# Patient Record
Sex: FEMALE
[De-identification: names, ages, dates, MRNs, and addresses within clinical notes are randomized per-mention and may not be internally consistent; named-entity substitution may affect disease eponyms.]

---

## 2024-01-31 LAB
ABO, EXTERNAL RESULT: NORMAL
HEP B, EXTERNAL RESULT: NEGATIVE
HIV, EXTERNAL RESULT: NEGATIVE
RPR, EXTERNAL RESULT: NONREACTIVE
RUBELLA TITER, EXTERNAL RESULT: NORMAL

## 2024-05-18 ENCOUNTER — HOSPITAL ENCOUNTER (EMERGENCY)
Facility: HOSPITAL | Age: 29
Discharge: HOME | End: 2024-05-18

## 2024-05-18 VITALS
TEMPERATURE: 98.1 F | SYSTOLIC BLOOD PRESSURE: 115 MMHG | HEART RATE: 94 BPM | DIASTOLIC BLOOD PRESSURE: 56 MMHG | RESPIRATION RATE: 14 BRPM

## 2024-05-18 DIAGNOSIS — Z3A.25 25 WEEKS GESTATION OF PREGNANCY: Primary | ICD-10-CM

## 2024-05-18 DIAGNOSIS — O36.8120 DECREASED FETAL MOVEMENTS IN SECOND TRIMESTER, SINGLE OR UNSPECIFIED FETUS: ICD-10-CM

## 2024-05-18 PROCEDURE — 99282 EMERGENCY DEPT VISIT SF MDM: CPT

## 2024-05-18 PROCEDURE — 4500000002 HC ER NO CHARGE

## 2024-05-18 ASSESSMENT — ENCOUNTER SYMPTOMS
VOMITING: 0
NAUSEA: 0
ABDOMINAL PAIN: 0
COUGH: 0
SORE THROAT: 0

## 2024-05-18 NOTE — ED PROVIDER NOTES
Activity: Not on file   Stress: Not on file   Social Connections: Not on file   Intimate Partner Violence: Not on file   Housing Stability: Not on file   Denies hx STIs      ALLERGIES: Patient has no known allergies.    Review of Systems   Constitutional:  Negative for chills, fatigue and fever.   HENT:  Negative for sore throat.    Eyes:  Negative for visual disturbance.   Respiratory:  Negative for cough and shortness of breath.    Cardiovascular:  Negative for chest pain.   Gastrointestinal:  Negative for abdominal pain, diarrhea, nausea and vomiting.   Genitourinary:  Negative for dysuria, flank pain, frequency, pelvic pain, urgency, vaginal bleeding and vaginal discharge.   Neurological:  Negative for headaches.       Vitals:    05/18/24 1200   BP: (!) 115/56   Pulse: 94   Resp: 14   Temp: 98.1 °F (36.7 °C)   TempSrc: Oral            Physical Exam  Constitutional:       General: She is awake. She is not in acute distress.     Appearance: Normal appearance. She is well-groomed. She is obese. She is not ill-appearing.   HENT:      Head: Normocephalic.   Cardiovascular:      Rate and Rhythm: Normal rate.   Pulmonary:      Effort: Pulmonary effort is normal. No respiratory distress.   Abdominal:      Tenderness: There is no abdominal tenderness.      Comments: Gravid c/w 25w  FHTs: 145s, accels present, decels absent, moderate variability; audible movement heard on US; patient initially not feeling and then started feeling movement  [place]: none   Genitourinary:     Rectum: Normal.      Comments: Deferred, no labor complaints  Musculoskeletal:         General: Normal range of motion.      Cervical back: Normal range of motion.      Right lower leg: No edema.      Left lower leg: No edema.   Skin:     General: Skin is warm and dry.   Neurological:      Mental Status: She is alert and oriented to person, place, and time.      Gait: Gait is intact.   Psychiatric:         Attention and Perception: Attention normal.

## 2024-05-18 NOTE — PROGRESS NOTES
1153: pt here from home with reports of not feeling the baby move x 2days. Denies bleeding or leaking fluid.  1206: Thom BLUE at bedside   1212: pt felt fetus kick  1220: ambulatory off unit

## 2024-07-31 LAB — GBS, EXTERNAL RESULT: NEGATIVE

## 2024-08-15 ENCOUNTER — HOSPITAL ENCOUNTER (OUTPATIENT)
Facility: HOSPITAL | Age: 29
Setting detail: OBSERVATION
Discharge: HOME | End: 2024-08-15
Attending: STUDENT IN AN ORGANIZED HEALTH CARE EDUCATION/TRAINING PROGRAM | Admitting: OBSTETRICS & GYNECOLOGY
Payer: COMMERCIAL

## 2024-08-15 VITALS
SYSTOLIC BLOOD PRESSURE: 127 MMHG | OXYGEN SATURATION: 98 % | TEMPERATURE: 97.7 F | DIASTOLIC BLOOD PRESSURE: 66 MMHG | HEART RATE: 81 BPM | RESPIRATION RATE: 17 BRPM

## 2024-08-15 PROBLEM — O36.8190 DECREASED FETAL MOVEMENT AFFECTING MANAGEMENT OF MOTHER, ANTEPARTUM: Status: ACTIVE | Noted: 2024-08-15

## 2024-08-15 PROCEDURE — 4500000002 HC ER NO CHARGE

## 2024-08-15 PROCEDURE — G0378 HOSPITAL OBSERVATION PER HR: HCPCS

## 2024-08-15 RX ORDER — ACETAMINOPHEN 500 MG
1000 TABLET ORAL EVERY 8 HOURS SCHEDULED
Status: DISCONTINUED | OUTPATIENT
Start: 2024-08-15 | End: 2024-08-16 | Stop reason: HOSPADM

## 2024-08-15 RX ORDER — ONDANSETRON 4 MG/1
4 TABLET, ORALLY DISINTEGRATING ORAL EVERY 8 HOURS PRN
Status: DISCONTINUED | OUTPATIENT
Start: 2024-08-15 | End: 2024-08-16 | Stop reason: HOSPADM

## 2024-08-15 RX ORDER — ONDANSETRON 2 MG/ML
4 INJECTION INTRAMUSCULAR; INTRAVENOUS EVERY 6 HOURS PRN
Status: DISCONTINUED | OUTPATIENT
Start: 2024-08-15 | End: 2024-08-16 | Stop reason: HOSPADM

## 2024-08-15 NOTE — PROGRESS NOTES
Bedside and Verbal shift change report given to TALHA Macias RN (oncoming nurse) by ANTOINE Cullen (offgoing nurse). Report included the following information Nurse Handoff Report, Index, Adult Overview, MAR, and Recent Results.    2153: Dr. Armendariz at bedside, speaking with patient. OK to D/C patient.

## 2024-08-15 NOTE — PROGRESS NOTES
1637-pt here with decreased fetal movement  1650--Marcello in to see patient, will continue to monitor

## 2024-08-15 NOTE — ED PROVIDER NOTES
Deb is a pleasant 27 yo G1 @ 37 6/7 wks who presents to [facility] this afternoon with CC of decreased FM.  She states she hasn't really felt movement since this AM.  She had normal movement yesterday, but had a migraine, took some tylenol and then slept all night.  She normally notices movement very easily with her entire abdomen moving but not so today.  She denies any trauma or strenuous activity.  Only additional med has been the tylenol.  She has eaten normally today.    PNC has been with Dr. Richardson of [facility] and has been essentially uncomplicated:  --zoloft  --TDAP 6/6/2024  --failed 1 hr, passed 3 hr  --Rh+/RI/GBS neg/HB neg/VDRL NR/HIV NR        The history is provided by the patient and medical records.        No chief complaint on file.      Past Medical History:   Diagnosis Date    Anxiety and depression        Past Surgical History:   Procedure Laterality Date    TONSILLECTOMY      WISDOM TOOTH EXTRACTION           Family History   Problem Relation Age of Onset    Breast Cancer Paternal Grandmother     Diabetes Maternal Grandfather     High Cholesterol Father     Hypertension Father        Social History     Socioeconomic History    Marital status: Not on file     Spouse name: Not on file    Number of children: Not on file    Years of education: Not on file    Highest education level: Not on file   Occupational History    Not on file   Tobacco Use    Smoking status: Never    Smokeless tobacco: Never   Substance and Sexual Activity    Alcohol use: Not Currently    Drug use: Never    Sexual activity: Not on file   Other Topics Concern    Not on file   Social History Narrative    Not on file     Social Determinants of Health     Financial Resource Strain: Not on file   Food Insecurity: Not on file   Transportation Needs: Not on file   Physical Activity: Not on file   Stress: Not on file   Social Connections: Not on file   Intimate Partner Violence: Not on file   Housing Stability: Not on file         ALLERGIES:

## 2024-08-15 NOTE — PROGRESS NOTES
1637 Pt of MD arrives ambulatory with significant other c/o decreased fetal movement. Pt is , GA 37w6d. PMH significant for anxiety/depression. Pt has NKA and takes daily PNV. Pt denies LOF, VB, or contractions. Pt denies headache, blurred vision, or RUQ pain. MD notified of pt arrival. Pt oriented to room and call bell within reach.    1650 Dr. Armendariz at bedside to assess pt and discuss plan of care. Verbal orders received

## 2024-08-16 NOTE — H&P
Pt admitted from [facility] for prolonged fetal monitoring.    Deb is a pleasant 29 yo G1 @ 37 6/7 wks who presents to [facility] this afternoon with CC of decreased FM.  She states she hasn't really felt movement since this AM.  She had normal movement yesterday, but had a migraine, took some tylenol and then slept all night.  She normally notices movement very easily with her entire abdomen moving but not so today.  She denies any trauma or strenuous activity.  Only additional med has been the tylenol.  She has eaten normally today.     PNC has been with Dr. Richardson of [facility] and has been essentially uncomplicated:  --zoloft  --TDAP 6/6/2024  --failed 1 hr, passed 3 hr  --Rh+/RI/GBS neg/HB neg/VDRL NR/HIV NR           The history is provided by the patient and medical records.         No chief complaint on file.        Past Medical History        Past Medical History:   Diagnosis Date    Anxiety and depression              Past Surgical History         Past Surgical History:   Procedure Laterality Date    TONSILLECTOMY        WISDOM TOOTH EXTRACTION                 Family History         Family History   Problem Relation Age of Onset    Breast Cancer Paternal Grandmother      Diabetes Maternal Grandfather      High Cholesterol Father      Hypertension Father              Social History               Socioeconomic History    Marital status: Not on file       Spouse name: Not on file    Number of children: Not on file    Years of education: Not on file    Highest education level: Not on file   Occupational History    Not on file   Tobacco Use    Smoking status: Never    Smokeless tobacco: Never   Substance and Sexual Activity    Alcohol use: Not Currently    Drug use: Never    Sexual activity: Not on file   Other Topics Concern    Not on file   Social History Narrative    Not on file      Social Determinants of Health      Financial Resource Strain: Not on file   Food Insecurity: Not on file   Transportation Needs: Not on file

## 2024-08-16 NOTE — DISCHARGE SUMMARY
Antepartum  Discharge Summary     Patient ID:  Deb Mckenzie  [id number]  28 y.o.  1995    Admit date: 8/15/2024    Discharge date: 8/15/2024    Admission Diagnoses:    Patient Active Problem List   Diagnosis    Decreased fetal movement affecting management of mother, antepartum       Discharge Diagnoses: There are no discharge diagnoses documented for the most recent discharge.  Patient Active Problem List   Diagnosis    Decreased fetal movement affecting management of mother, antepartum       Procedures for this admission: Prolonged monitorin    Hospital Course:  Pt monitored for over 5 hours with CAT I tracing.  She was feeling more movement during stay.  Initial monitoring was concerning for min variability and no accels.  At times negative CST noted in [facility].  At times reactive tracing.  Except for initial monitoring, reassuring tracing throughout.    Disposition: Home or self care    Discharged Condition: good    Patient plans to return for changes in her condition or the condition of the baby or for delivery of the baby.    Patient Instructions:   Discharge Medication List as of 8/15/2024 10:19 PM        CONTINUE these medications which have NOT CHANGED    Details   sertraline (ZOLOFT) 50 MG tablet Take 1 tablet by mouth dailyHistorical Med           Activity: activity as tolerated  Diet: regular diet    Follow-up with No follow-ups on file.     Signed:  Melania Armendariz MD  8/15/2024  10:34 PM

## 2024-08-16 NOTE — PROGRESS NOTES
Pt monitored x 5+ hours.  Reassuring and at times reactive tracing throughout.  Pt started feeling some more movement while here.  Ok to discharge home.  Pt to follow up in office tomorrow AM as scheduled.  Advised to return for evaluation if decreased FM.

## 2024-08-31 ENCOUNTER — HOSPITAL ENCOUNTER (EMERGENCY)
Facility: HOSPITAL | Age: 29
Discharge: HOME | DRG: 560 | End: 2024-08-31
Payer: MEDICAID

## 2024-08-31 ENCOUNTER — ANESTHESIA EVENT (OUTPATIENT)
Facility: HOSPITAL | Age: 29
End: 2024-08-31
Payer: COMMERCIAL

## 2024-08-31 ENCOUNTER — ANESTHESIA (OUTPATIENT)
Facility: HOSPITAL | Age: 29
End: 2024-08-31
Payer: COMMERCIAL

## 2024-08-31 ENCOUNTER — HOSPITAL ENCOUNTER (INPATIENT)
Facility: HOSPITAL | Age: 29
LOS: 2 days | Discharge: HOME | DRG: 560 | End: 2024-09-02
Attending: STUDENT IN AN ORGANIZED HEALTH CARE EDUCATION/TRAINING PROGRAM | Admitting: OBSTETRICS & GYNECOLOGY
Payer: MEDICAID

## 2024-08-31 VITALS
TEMPERATURE: 97.8 F | RESPIRATION RATE: 16 BRPM | OXYGEN SATURATION: 99 % | HEART RATE: 77 BPM | WEIGHT: 217 LBS | DIASTOLIC BLOOD PRESSURE: 64 MMHG | SYSTOLIC BLOOD PRESSURE: 128 MMHG

## 2024-08-31 PROBLEM — O47.9 UTERINE CONTRACTIONS: Status: ACTIVE | Noted: 2024-08-31

## 2024-08-31 LAB
ABO + RH BLD: NORMAL
BASOPHILS # BLD: 0.1 K/UL (ref 0–0.1)
BASOPHILS NFR BLD: 0 % (ref 0–1)
BLOOD GROUP ANTIBODIES SERPL: NORMAL
DIFFERENTIAL METHOD BLD: ABNORMAL
EOSINOPHIL # BLD: 0 K/UL (ref 0–0.4)
EOSINOPHIL NFR BLD: 0 % (ref 0–7)
ERYTHROCYTE [DISTWIDTH] IN BLOOD BY AUTOMATED COUNT: 12.3 % (ref 11.5–14.5)
HCT VFR BLD AUTO: 37.1 % (ref 35–47)
HGB BLD-MCNC: 13.1 G/DL (ref 11.5–16)
IMM GRANULOCYTES # BLD AUTO: 0.1 K/UL (ref 0–0.04)
IMM GRANULOCYTES NFR BLD AUTO: 1 % (ref 0–0.5)
LYMPHOCYTES # BLD: 1.7 K/UL (ref 0.8–3.5)
LYMPHOCYTES NFR BLD: 9 % (ref 12–49)
MCH RBC QN AUTO: 29.1 PG (ref 26–34)
MCHC RBC AUTO-ENTMCNC: 35.3 G/DL (ref 30–36.5)
MCV RBC AUTO: 82.4 FL (ref 80–99)
MONOCYTES # BLD: 0.8 K/UL (ref 0–1)
MONOCYTES NFR BLD: 4 % (ref 5–13)
NEUTS SEG # BLD: 16.8 K/UL (ref 1.8–8)
NEUTS SEG NFR BLD: 86 % (ref 32–75)
NRBC # BLD: 0 K/UL (ref 0–0.01)
NRBC BLD-RTO: 0 PER 100 WBC
PLATELET # BLD AUTO: 284 K/UL (ref 150–400)
PMV BLD AUTO: 10.2 FL (ref 8.9–12.9)
RBC # BLD AUTO: 4.5 M/UL (ref 3.8–5.2)
SPECIMEN EXP DATE BLD: NORMAL
WBC # BLD AUTO: 19.4 K/UL (ref 3.6–11)

## 2024-08-31 PROCEDURE — 7220000101 HC DELIVERY VAGINAL/SINGLE

## 2024-08-31 PROCEDURE — 51701 INSERT BLADDER CATHETER: CPT

## 2024-08-31 PROCEDURE — 00HU33Z INSERTION OF INFUSION DEVICE INTO SPINAL CANAL, PERCUTANEOUS APPROACH: ICD-10-PCS | Performed by: ANESTHESIOLOGY

## 2024-08-31 PROCEDURE — 86850 RBC ANTIBODY SCREEN: CPT

## 2024-08-31 PROCEDURE — 6360000002 HC RX W HCPCS: Performed by: MIDWIFE

## 2024-08-31 PROCEDURE — 86901 BLOOD TYPING SEROLOGIC RH(D): CPT

## 2024-08-31 PROCEDURE — 7100000000 HC PACU RECOVERY - FIRST 15 MIN

## 2024-08-31 PROCEDURE — 7100000001 HC PACU RECOVERY - ADDTL 15 MIN

## 2024-08-31 PROCEDURE — 2580000003 HC RX 258: Performed by: MIDWIFE

## 2024-08-31 PROCEDURE — 0KQM0ZZ REPAIR PERINEUM MUSCLE, OPEN APPROACH: ICD-10-PCS | Performed by: STUDENT IN AN ORGANIZED HEALTH CARE EDUCATION/TRAINING PROGRAM

## 2024-08-31 PROCEDURE — 4A1HXCZ MONITORING OF PRODUCTS OF CONCEPTION, CARDIAC RATE, EXTERNAL APPROACH: ICD-10-PCS | Performed by: STUDENT IN AN ORGANIZED HEALTH CARE EDUCATION/TRAINING PROGRAM

## 2024-08-31 PROCEDURE — 7210000100 HC LABOR FEE PER 1 HR

## 2024-08-31 PROCEDURE — 36415 COLL VENOUS BLD VENIPUNCTURE: CPT

## 2024-08-31 PROCEDURE — 10907ZC DRAINAGE OF AMNIOTIC FLUID, THERAPEUTIC FROM PRODUCTS OF CONCEPTION, VIA NATURAL OR ARTIFICIAL OPENING: ICD-10-PCS | Performed by: STUDENT IN AN ORGANIZED HEALTH CARE EDUCATION/TRAINING PROGRAM

## 2024-08-31 PROCEDURE — 6360000002 HC RX W HCPCS: Performed by: ANESTHESIOLOGY

## 2024-08-31 PROCEDURE — 2500000003 HC RX 250 WO HCPCS: Performed by: ANESTHESIOLOGY

## 2024-08-31 PROCEDURE — 86900 BLOOD TYPING SEROLOGIC ABO: CPT

## 2024-08-31 PROCEDURE — 3700000025 EPIDURAL BLOCK: Performed by: STUDENT IN AN ORGANIZED HEALTH CARE EDUCATION/TRAINING PROGRAM

## 2024-08-31 PROCEDURE — 85025 COMPLETE CBC W/AUTO DIFF WBC: CPT

## 2024-08-31 PROCEDURE — 86592 SYPHILIS TEST NON-TREP QUAL: CPT

## 2024-08-31 PROCEDURE — 4500000002 HC ER NO CHARGE

## 2024-08-31 PROCEDURE — 99284 EMERGENCY DEPT VISIT MOD MDM: CPT

## 2024-08-31 PROCEDURE — 6370000000 HC RX 637 (ALT 250 FOR IP): Performed by: MIDWIFE

## 2024-08-31 PROCEDURE — 1100000000 HC RM PRIVATE

## 2024-08-31 RX ORDER — IBUPROFEN 400 MG/1
800 TABLET, FILM COATED ORAL EVERY 8 HOURS SCHEDULED
Status: DISCONTINUED | OUTPATIENT
Start: 2024-08-31 | End: 2024-09-02 | Stop reason: HOSPADM

## 2024-08-31 RX ORDER — FENTANYL CITRATE 50 UG/ML
INJECTION, SOLUTION INTRAMUSCULAR; INTRAVENOUS
Status: DISCONTINUED
Start: 2024-08-31 | End: 2024-08-31

## 2024-08-31 RX ORDER — SODIUM CHLORIDE 9 MG/ML
25 INJECTION, SOLUTION INTRAVENOUS PRN
Status: DISCONTINUED | OUTPATIENT
Start: 2024-08-31 | End: 2024-08-31

## 2024-08-31 RX ORDER — EPHEDRINE SULFATE 50 MG/ML
10 INJECTION INTRAVENOUS
Status: DISCONTINUED | OUTPATIENT
Start: 2024-08-31 | End: 2024-08-31

## 2024-08-31 RX ORDER — ONDANSETRON 2 MG/ML
4 INJECTION INTRAMUSCULAR; INTRAVENOUS EVERY 6 HOURS PRN
Status: DISCONTINUED | OUTPATIENT
Start: 2024-08-31 | End: 2024-08-31

## 2024-08-31 RX ORDER — SODIUM CHLORIDE, SODIUM LACTATE, POTASSIUM CHLORIDE, AND CALCIUM CHLORIDE .6; .31; .03; .02 G/100ML; G/100ML; G/100ML; G/100ML
500 INJECTION, SOLUTION INTRAVENOUS PRN
Status: DISCONTINUED | OUTPATIENT
Start: 2024-08-31 | End: 2024-08-31

## 2024-08-31 RX ORDER — DIPHENHYDRAMINE HCL 25 MG
25 CAPSULE ORAL EVERY 6 HOURS PRN
Status: DISCONTINUED | OUTPATIENT
Start: 2024-08-31 | End: 2024-08-31

## 2024-08-31 RX ORDER — ACETAMINOPHEN 500 MG
1000 TABLET ORAL EVERY 8 HOURS SCHEDULED
Status: DISCONTINUED | OUTPATIENT
Start: 2024-08-31 | End: 2024-09-02 | Stop reason: HOSPADM

## 2024-08-31 RX ORDER — NALOXONE HYDROCHLORIDE 0.4 MG/ML
INJECTION, SOLUTION INTRAMUSCULAR; INTRAVENOUS; SUBCUTANEOUS PRN
Status: DISCONTINUED | OUTPATIENT
Start: 2024-08-31 | End: 2024-08-31

## 2024-08-31 RX ORDER — ONDANSETRON 2 MG/ML
4 INJECTION INTRAMUSCULAR; INTRAVENOUS EVERY 6 HOURS PRN
Status: DISCONTINUED | OUTPATIENT
Start: 2024-08-31 | End: 2024-09-02 | Stop reason: HOSPADM

## 2024-08-31 RX ORDER — CARBOPROST TROMETHAMINE 250 UG/ML
250 INJECTION, SOLUTION INTRAMUSCULAR PRN
Status: DISCONTINUED | OUTPATIENT
Start: 2024-08-31 | End: 2024-08-31

## 2024-08-31 RX ORDER — LIDOCAINE HCL/EPINEPHRINE/PF 2%-1:200K
VIAL (ML) INJECTION
Status: COMPLETED
Start: 2024-08-31 | End: 2024-08-31

## 2024-08-31 RX ORDER — SODIUM CHLORIDE, SODIUM LACTATE, POTASSIUM CHLORIDE, AND CALCIUM CHLORIDE .6; .31; .03; .02 G/100ML; G/100ML; G/100ML; G/100ML
500 INJECTION, SOLUTION INTRAVENOUS ONCE
Status: COMPLETED | OUTPATIENT
Start: 2024-08-31 | End: 2024-08-31

## 2024-08-31 RX ORDER — BUPIVACAINE HYDROCHLORIDE 5 MG/ML
INJECTION, SOLUTION EPIDURAL; INTRACAUDAL
Status: COMPLETED
Start: 2024-08-31 | End: 2024-08-31

## 2024-08-31 RX ORDER — METHYLERGONOVINE MALEATE 0.2 MG/ML
200 INJECTION INTRAVENOUS PRN
Status: DISCONTINUED | OUTPATIENT
Start: 2024-08-31 | End: 2024-08-31

## 2024-08-31 RX ORDER — SODIUM CHLORIDE 0.9 % (FLUSH) 0.9 %
5-40 SYRINGE (ML) INJECTION PRN
Status: DISCONTINUED | OUTPATIENT
Start: 2024-08-31 | End: 2024-08-31

## 2024-08-31 RX ORDER — FENTANYL CITRATE 50 UG/ML
INJECTION, SOLUTION INTRAMUSCULAR; INTRAVENOUS PRN
Status: DISCONTINUED | OUTPATIENT
Start: 2024-08-31 | End: 2024-08-31 | Stop reason: SDUPTHER

## 2024-08-31 RX ORDER — SODIUM CHLORIDE 0.9 % (FLUSH) 0.9 %
5-40 SYRINGE (ML) INJECTION EVERY 12 HOURS SCHEDULED
Status: DISCONTINUED | OUTPATIENT
Start: 2024-08-31 | End: 2024-09-02 | Stop reason: HOSPADM

## 2024-08-31 RX ORDER — BUPIVACAINE HYDROCHLORIDE 2.5 MG/ML
INJECTION, SOLUTION EPIDURAL; INFILTRATION; INTRACAUDAL PRN
Status: DISCONTINUED | OUTPATIENT
Start: 2024-08-31 | End: 2024-08-31 | Stop reason: SDUPTHER

## 2024-08-31 RX ORDER — EPHEDRINE SULFATE 50 MG/ML
5 INJECTION INTRAVENOUS PRN
Status: DISCONTINUED | OUTPATIENT
Start: 2024-09-01 | End: 2024-08-31

## 2024-08-31 RX ORDER — DIPHENHYDRAMINE HYDROCHLORIDE 50 MG/ML
25 INJECTION INTRAMUSCULAR; INTRAVENOUS EVERY 6 HOURS PRN
Status: DISCONTINUED | OUTPATIENT
Start: 2024-08-31 | End: 2024-08-31

## 2024-08-31 RX ORDER — SODIUM CHLORIDE, SODIUM LACTATE, POTASSIUM CHLORIDE, CALCIUM CHLORIDE 600; 310; 30; 20 MG/100ML; MG/100ML; MG/100ML; MG/100ML
INJECTION, SOLUTION INTRAVENOUS CONTINUOUS
Status: DISCONTINUED | OUTPATIENT
Start: 2024-08-31 | End: 2024-08-31

## 2024-08-31 RX ORDER — SODIUM CHLORIDE 0.9 % (FLUSH) 0.9 %
5-40 SYRINGE (ML) INJECTION PRN
Status: DISCONTINUED | OUTPATIENT
Start: 2024-08-31 | End: 2024-09-02 | Stop reason: HOSPADM

## 2024-08-31 RX ORDER — ONDANSETRON 4 MG/1
4 TABLET, ORALLY DISINTEGRATING ORAL EVERY 6 HOURS PRN
Status: DISCONTINUED | OUTPATIENT
Start: 2024-08-31 | End: 2024-09-02 | Stop reason: HOSPADM

## 2024-08-31 RX ORDER — FENTANYL/BUPIVACAINE/NS/PF 2-1250MCG
1-15 PLASTIC BAG, INJECTION (ML) INJECTION CONTINUOUS
Status: DISCONTINUED | OUTPATIENT
Start: 2024-08-31 | End: 2024-08-31

## 2024-08-31 RX ORDER — ONDANSETRON 4 MG/1
4 TABLET, ORALLY DISINTEGRATING ORAL EVERY 6 HOURS PRN
Status: DISCONTINUED | OUTPATIENT
Start: 2024-08-31 | End: 2024-08-31

## 2024-08-31 RX ORDER — BUPIVACAINE HYDROCHLORIDE 5 MG/ML
INJECTION, SOLUTION EPIDURAL; INTRACAUDAL PRN
Status: DISCONTINUED | OUTPATIENT
Start: 2024-08-31 | End: 2024-08-31 | Stop reason: SDUPTHER

## 2024-08-31 RX ORDER — DOCUSATE SODIUM 100 MG/1
100 CAPSULE, LIQUID FILLED ORAL 2 TIMES DAILY
Status: DISCONTINUED | OUTPATIENT
Start: 2024-08-31 | End: 2024-09-02 | Stop reason: HOSPADM

## 2024-08-31 RX ORDER — LIDOCAINE HCL/EPINEPHRINE/PF 2%-1:200K
VIAL (ML) INJECTION PRN
Status: DISCONTINUED | OUTPATIENT
Start: 2024-08-31 | End: 2024-08-31 | Stop reason: SDUPTHER

## 2024-08-31 RX ORDER — BUPIVACAINE HYDROCHLORIDE 2.5 MG/ML
INJECTION, SOLUTION EPIDURAL; INFILTRATION; INTRACAUDAL
Status: COMPLETED
Start: 2024-08-31 | End: 2024-08-31

## 2024-08-31 RX ORDER — MODIFIED LANOLIN
OINTMENT (GRAM) TOPICAL PRN
Status: DISCONTINUED | OUTPATIENT
Start: 2024-08-31 | End: 2024-09-02 | Stop reason: HOSPADM

## 2024-08-31 RX ORDER — SODIUM CHLORIDE 0.9 % (FLUSH) 0.9 %
5-40 SYRINGE (ML) INJECTION EVERY 12 HOURS SCHEDULED
Status: DISCONTINUED | OUTPATIENT
Start: 2024-08-31 | End: 2024-08-31

## 2024-08-31 RX ORDER — MISOPROSTOL 200 UG/1
400 TABLET ORAL PRN
Status: DISCONTINUED | OUTPATIENT
Start: 2024-08-31 | End: 2024-08-31

## 2024-08-31 RX ORDER — ACETAMINOPHEN 500 MG
1000 TABLET ORAL EVERY 8 HOURS PRN
Status: DISCONTINUED | OUTPATIENT
Start: 2024-08-31 | End: 2024-08-31

## 2024-08-31 RX ORDER — TERBUTALINE SULFATE 1 MG/ML
0.25 INJECTION, SOLUTION SUBCUTANEOUS
Status: DISCONTINUED | OUTPATIENT
Start: 2024-08-31 | End: 2024-08-31

## 2024-08-31 RX ORDER — SODIUM CHLORIDE, SODIUM LACTATE, POTASSIUM CHLORIDE, CALCIUM CHLORIDE 600; 310; 30; 20 MG/100ML; MG/100ML; MG/100ML; MG/100ML
INJECTION, SOLUTION INTRAVENOUS CONTINUOUS
Status: DISCONTINUED | OUTPATIENT
Start: 2024-08-31 | End: 2024-09-02 | Stop reason: HOSPADM

## 2024-08-31 RX ORDER — SODIUM CHLORIDE 9 MG/ML
INJECTION, SOLUTION INTRAVENOUS PRN
Status: DISCONTINUED | OUTPATIENT
Start: 2024-08-31 | End: 2024-09-02 | Stop reason: HOSPADM

## 2024-08-31 RX ADMIN — SODIUM CHLORIDE, SODIUM LACTATE, POTASSIUM CHLORIDE, AND CALCIUM CHLORIDE 500 ML: 600; 310; 30; 20 INJECTION, SOLUTION INTRAVENOUS at 02:50

## 2024-08-31 RX ADMIN — BUPIVACAINE HYDROCHLORIDE 5 ML: 5 INJECTION, SOLUTION EPIDURAL; INTRACAUDAL; PERINEURAL at 10:18

## 2024-08-31 RX ADMIN — Medication 10 ML/HR: at 10:32

## 2024-08-31 RX ADMIN — FENTANYL CITRATE 100 MCG: 50 INJECTION, SOLUTION INTRAMUSCULAR; INTRAVENOUS at 10:18

## 2024-08-31 RX ADMIN — SODIUM CHLORIDE, POTASSIUM CHLORIDE, SODIUM LACTATE AND CALCIUM CHLORIDE: 600; 310; 30; 20 INJECTION, SOLUTION INTRAVENOUS at 21:50

## 2024-08-31 RX ADMIN — BUPIVACAINE HYDROCHLORIDE 5 ML: 2.5 INJECTION, SOLUTION EPIDURAL; INFILTRATION; INTRACAUDAL; PERINEURAL at 17:53

## 2024-08-31 RX ADMIN — BUPIVACAINE HYDROCHLORIDE 5 MG: 2.5 INJECTION, SOLUTION EPIDURAL; INFILTRATION; INTRACAUDAL; PERINEURAL at 15:43

## 2024-08-31 RX ADMIN — FENTANYL CITRATE 100 MCG: 50 INJECTION, SOLUTION INTRAMUSCULAR; INTRAVENOUS at 18:54

## 2024-08-31 RX ADMIN — IBUPROFEN 800 MG: 400 TABLET, FILM COATED ORAL at 23:33

## 2024-08-31 RX ADMIN — OXYTOCIN 1 MILLI-UNITS/MIN: 10 INJECTION INTRAVENOUS at 20:54

## 2024-08-31 RX ADMIN — Medication 166.7 ML: at 22:26

## 2024-08-31 RX ADMIN — SODIUM CHLORIDE, POTASSIUM CHLORIDE, SODIUM LACTATE AND CALCIUM CHLORIDE: 600; 310; 30; 20 INJECTION, SOLUTION INTRAVENOUS at 18:54

## 2024-08-31 RX ADMIN — Medication 10 ML/HR: at 17:43

## 2024-08-31 RX ADMIN — LIDOCAINE HYDROCHLORIDE AND EPINEPHRINE 5 ML: 20; 5 INJECTION, SOLUTION EPIDURAL; INFILTRATION; INTRACAUDAL; PERINEURAL at 10:15

## 2024-08-31 RX ADMIN — FENTANYL CITRATE 100 MCG: 50 INJECTION, SOLUTION INTRAMUSCULAR; INTRAVENOUS at 15:43

## 2024-08-31 RX ADMIN — SODIUM CHLORIDE, POTASSIUM CHLORIDE, SODIUM LACTATE AND CALCIUM CHLORIDE 500 ML: 600; 310; 30; 20 INJECTION, SOLUTION INTRAVENOUS at 09:37

## 2024-08-31 RX ADMIN — LIDOCAINE HYDROCHLORIDE AND EPINEPHRINE 6 ML: 20; 5 INJECTION, SOLUTION EPIDURAL; INFILTRATION; INTRACAUDAL; PERINEURAL at 18:54

## 2024-08-31 NOTE — ANESTHESIA PROCEDURE NOTES
Epidural Block    Patient location during procedure: OB  Start time: 8/31/2024 10:08 AM  End time: 8/31/2024 10:19 AM  Reason for block: labor epidural  Staffing  Performed: anesthesiologist   Anesthesiologist: Sergei Billings MD  Performed by: Sergei Billings MD  Authorized by: Sergei Billings MD    Epidural  Patient position: sitting  Prep: DuraPrep  Patient monitoring: cardiac monitor, continuous pulse ox and frequent blood pressure checks  Approach: midline  Location: L3-4  Injection technique: BILL air  Provider prep: mask and sterile gloves  Needle  Needle type: Tuohy   Needle gauge: 17 G  Needle length: 3.5 in  Needle insertion depth: 7 cm  Catheter type: end hole  Catheter size: 18 G  Catheter at skin depth: 11 cmCatheter Secured: tegaderm and tape  Assessment  Sensory level: T10  Events: None  Hemodynamics: stable  Attempts: 1  Outcomes: uncomplicated and patient tolerated procedure well  Preanesthetic Checklist  Completed: patient identified, IV checked, site marked, risks and benefits discussed, surgical/procedural consents, equipment checked, pre-op evaluation, timeout performed, anesthesia consent given, oxygen available, monitors applied/VS acknowledged and fire risk safety assessment completed and verbalized

## 2024-08-31 NOTE — PROGRESS NOTES
24: Bedside and Verbal shift change report given to Jeanne Cochran RN (oncoming nurse) by Diane Montgomery RN (offgoing nurse). Report included the following information Nurse Handoff Report, Adult Overview, Intake/Output, MAR, Recent Results, and Event Log.     Pt resting comfortably in hands/knees position, partner at BS. Will continue to labor down and reassess pressure/urge to push.     : Pt repositioned from Hands/Knees to supine with R tilt per request. Prolonged decel following position change. Repositioned to R lateral, trendelenburg, RNx2 at BS and SVE done. 10/100/0    FHR back to baseline and pt in R lateral flying cowgirl.     : Spoke with MIRZA Justin, plan to start pitocin after 30 minutes with no decels.   : Pitocin started.    : Pt pushing, RN remains at BS continuously monitoring pt and FHR.     : Alternating closed and open knee pushing.   : MIRZA Justin at BS for delivery  :  of liveborn female.     0045: Pt up to BR with RN stand by assist, steady gait. +void. Instructed on pericare and performed with minimal assist. Provided with all PP supplies and new gown. Pt tolerated all care well.     0115: Bedside and Verbal shift change report given to CESAR LIMA RN (oncoming nurse) by ANTOINE COCHRAN RN (offgoing nurse). Report included the following information Nurse Handoff Report, Intake/Output, MAR, Recent Results, and Event Log.      The information on the  identification bands has been checked with the mother, verifying that all information and spelling is correct. Matching identification bands are present on the , mother, and support person and have been verified by transferring nurse, KARINE MORALES, and receiving nurse, CESAR LIMA RN.

## 2024-08-31 NOTE — PROGRESS NOTES
Called by RN staff as they were having difficulty monitoring contractions.     VE: 7/100/0  AROM for mod. Amount pink tinged fluid    Cat II tracing     Anticipate

## 2024-08-31 NOTE — PROGRESS NOTES
0046 pt arrives to unit ambulatory accompanied by spouse w/ c/o ctx around 1900 q5-7 min apart. Pt reports FM, denies VB, LOF, h/a, blurry vision, RUQ pain. EFM applied. Centricity application not working from [id number].     0100 MIRZA Vick notified of arrival    0105 CNM at bedside. EFM reviewed and SVE performed    0140 EFM removed per CNM. Pt given option to ambulate, IV fluids, IV pain meds and recheck at 0300.    0145 pt desires to ambulate halls for 20-30 min before getting IV    0305 CNM at bedside. SVE performed. Pt to be d/c home    0330 d/c instructions reviewed w/ pt. Pt verbalizes understanding. D/c ambulatory accompanied by spouse

## 2024-08-31 NOTE — ANESTHESIA PRE PROCEDURE
Department of Anesthesiology  Preprocedure Note       Name:  Deb Mckenzie   Age:  28 y.o.  :  1995                                          MRN:  [id number]         Date:  2024      Surgeon: * No surgeons listed *    Procedure: * No procedures listed *    Medications prior to admission:   Prior to Admission medications    Medication Sig Start Date End Date Taking? Authorizing Provider   sertraline (ZOLOFT) 50 MG tablet Take 1 tablet by mouth daily    Provider, MD Maegan       Current medications:    Current Facility-Administered Medications   Medication Dose Route Frequency Provider Last Rate Last Admin   • terbutaline (BRETHINE) injection 0.25 mg  0.25 mg SubCUTAneous Once PRN Margoth Vick APRN - CNM       • sodium chloride flush 0.9 % injection 5-40 mL  5-40 mL IntraVENous 2 times per day Margoth Vick APRN - CNM       • sodium chloride flush 0.9 % injection 5-40 mL  5-40 mL IntraVENous PRN Margoth Vick APRN - CNM       • 0.9 % sodium chloride infusion  25 mL IntraVENous PRN Margoth Vick APRN - CNM       • acetaminophen (TYLENOL) tablet 1,000 mg  1,000 mg Oral Q8H PRN Margoth Vick, APRN - CNM           Allergies:  No Known Allergies    Problem List:    Patient Active Problem List   Diagnosis Code   • Decreased fetal movement affecting management of mother, antepartum O36.8190   • Uterine contractions O47.9       Past Medical History:        Diagnosis Date   • Anxiety and depression        Past Surgical History:        Procedure Laterality Date   • TONSILLECTOMY     • WISDOM TOOTH EXTRACTION         Social History:    Social History     Tobacco Use   • Smoking status: Never   • Smokeless tobacco: Never   Substance Use Topics   • Alcohol use: Not Currently                                Counseling given: Not Answered      Vital Signs (Current):   Vitals:    24 0941   BP: 130/68   Pulse: 74   Resp: 18   TempSrc: Oral                                              BP

## 2024-08-31 NOTE — H&P
Labor and Delivery History & Physical  2024    HPI Patient is a 28 y.o.  @ 40w1d with minda of 2024 who was seen in [facility] around 0100 and d/c'd to home after no change in cervix. Fetal movement for last day or so has been less and she was admitted on OBS status on 8/15 for same complaint and had extended monitoring at that time/declined IOL. Early this am in [facility], she had reactive NST prior to d/c. Today, she denies vaginal bleeding, leaking of fluid, nausea/vomiting/diarrhea, fever, cough, or sore throat. She reports regular prenatal care with Dylan Castaneda and denies any issues with this pregnancy. She admits to taking Zoloft for anxiety and depression.     PNC: Blood type: O            RH: pos            Hep B: NEG            Rubella: immune            GBS status: NEG            HIV: NEG            RPR/TPA/VDRL: NR            GC/CT: NEG            HSV serology: not noted on prenatal records, but patient denies any hx/sxs    OBHX:   OB History          1    Para        Term                AB        Living             SAB        IAB        Ectopic        Molar        Multiple        Live Births                    PMH:   Past Medical History:   Diagnosis Date    Anxiety and depression        PSH:   Past Surgical History:   Procedure Laterality Date    TONSILLECTOMY      WISDOM TOOTH EXTRACTION         Medications:   Prior to Admission Medications   Prescriptions Last Dose Informant Patient Reported? Taking?   sertraline (ZOLOFT) 50 MG tablet   Yes No   Sig: Take 1 tablet by mouth daily      Facility-Administered Medications: None       Allergies: No Known Allergies    Pertinent ROS: Review of Systems - Negative except for hx of anxiety/depression, Pregnancy/contactions    Family Hx:   Family History   Problem Relation Age of Onset    Breast Cancer Paternal Grandmother     Diabetes Maternal Grandfather     High Cholesterol Father     Hypertension Father        Social Hs:   Social History

## 2024-08-31 NOTE — ED NOTES
Patient chose to walk after NST reactive.   IV hydration as well    No change to cervix  Plan d/c to home  Precautions reviewed

## 2024-08-31 NOTE — PROGRESS NOTES
8/31/2024  9:20 AM pt admitted to room 6 from home after being discharged at 0330 this morning. Her contractions have continued to get more intense and closer together. Denies ROM or vaginal bleeding, reports active fetal movement.   0929: SVE 6/90/0/BBFREDY, plan for admission and epidural   1009: Dr. Billings at bedside to place epidural   1345: SBAR at bedside to NADIRA Montgomery RN

## 2024-08-31 NOTE — PROGRESS NOTES
In room to evaluate. On R side after 2nd epidural re-dose.   Last VE by RN staff was 8/100/0    Cat II tracing     Anticipate

## 2024-08-31 NOTE — ED NOTES
HPI Patient is a 28 y.o.  @ 40w1d with minda of 2024 who presents to the [facility] at 0046 reporting contractions she feels are consistent with labor. She was seen in office yesterday and had membranes stripped. She was 2cms at that time. Today, she reports some fetal movement but feels it has been decreased. She was admitted on OBS status on 8/15 for same complaint and had extended monitoring at that time/declined IOL. Today, she denies vaginal bleeding, leaking of fluid, nausea/vomiting/diarrhea, fever, cough, or sore throat. She reports regular prenatal care with Dylan Castaneda and denies any issues with this pregnancy. She admits to taking Zoloft for anxiety and depression.        Chief Complaint   Patient presents with    Contractions       Past Medical History:   Diagnosis Date    Anxiety and depression        Past Surgical History:   Procedure Laterality Date    TONSILLECTOMY      WISDOM TOOTH EXTRACTION           Family History   Problem Relation Age of Onset    Breast Cancer Paternal Grandmother     Diabetes Maternal Grandfather     High Cholesterol Father     Hypertension Father        Social History     Socioeconomic History    Marital status: Not on file     Spouse name: Not on file    Number of children: Not on file    Years of education: Not on file    Highest education level: Not on file   Occupational History    Not on file   Tobacco Use    Smoking status: Never    Smokeless tobacco: Never   Substance and Sexual Activity    Alcohol use: Not Currently    Drug use: Never    Sexual activity: Not on file   Other Topics Concern    Not on file   Social History Narrative    Not on file     Social Determinants of Health     Financial Resource Strain: Not on file   Food Insecurity: Not on file   Transportation Needs: Not on file   Physical Activity: Not on file   Stress: Not on file   Social Connections: Not on file   Intimate Partner Violence: Not on file   Housing Stability: Not on file       ALLERGIES:

## 2024-09-01 LAB
ALBUMIN SERPL-MCNC: 2.3 G/DL (ref 3.5–5)
ALBUMIN/GLOB SERPL: 0.7 (ref 1.1–2.2)
ALP SERPL-CCNC: 169 U/L (ref 45–117)
ALT SERPL-CCNC: 41 U/L (ref 12–78)
ANION GAP SERPL CALC-SCNC: 6 MMOL/L (ref 5–15)
AST SERPL-CCNC: 49 U/L (ref 15–37)
BILIRUB SERPL-MCNC: 0.4 MG/DL (ref 0.2–1)
BUN SERPL-MCNC: 10 MG/DL (ref 6–20)
BUN/CREAT SERPL: 15 (ref 12–20)
CALCIUM SERPL-MCNC: 8.9 MG/DL (ref 8.5–10.1)
CHLORIDE SERPL-SCNC: 112 MMOL/L (ref 97–108)
CO2 SERPL-SCNC: 23 MMOL/L (ref 21–32)
CREAT SERPL-MCNC: 0.65 MG/DL (ref 0.55–1.02)
ERYTHROCYTE [DISTWIDTH] IN BLOOD BY AUTOMATED COUNT: 12.6 % (ref 11.5–14.5)
GLOBULIN SER CALC-MCNC: 3.1 G/DL (ref 2–4)
GLUCOSE SERPL-MCNC: 78 MG/DL (ref 65–100)
HCT VFR BLD AUTO: 31.6 % (ref 35–47)
HGB BLD-MCNC: 10.7 G/DL (ref 11.5–16)
MCH RBC QN AUTO: 28.6 PG (ref 26–34)
MCHC RBC AUTO-ENTMCNC: 33.9 G/DL (ref 30–36.5)
MCV RBC AUTO: 84.5 FL (ref 80–99)
NRBC # BLD: 0 K/UL (ref 0–0.01)
NRBC BLD-RTO: 0 PER 100 WBC
PLATELET # BLD AUTO: 236 K/UL (ref 150–400)
PMV BLD AUTO: 10.2 FL (ref 8.9–12.9)
POTASSIUM SERPL-SCNC: 3.5 MMOL/L (ref 3.5–5.1)
PROT SERPL-MCNC: 5.4 G/DL (ref 6.4–8.2)
RBC # BLD AUTO: 3.74 M/UL (ref 3.8–5.2)
SODIUM SERPL-SCNC: 141 MMOL/L (ref 136–145)
WBC # BLD AUTO: 18.2 K/UL (ref 3.6–11)

## 2024-09-01 PROCEDURE — 7100000001 HC PACU RECOVERY - ADDTL 15 MIN

## 2024-09-01 PROCEDURE — 6370000000 HC RX 637 (ALT 250 FOR IP): Performed by: STUDENT IN AN ORGANIZED HEALTH CARE EDUCATION/TRAINING PROGRAM

## 2024-09-01 PROCEDURE — 1120000000 HC RM PRIVATE OB

## 2024-09-01 PROCEDURE — 80053 COMPREHEN METABOLIC PANEL: CPT

## 2024-09-01 PROCEDURE — 85027 COMPLETE CBC AUTOMATED: CPT

## 2024-09-01 PROCEDURE — 36415 COLL VENOUS BLD VENIPUNCTURE: CPT

## 2024-09-01 PROCEDURE — 6370000000 HC RX 637 (ALT 250 FOR IP): Performed by: MIDWIFE

## 2024-09-01 RX ADMIN — IBUPROFEN 800 MG: 400 TABLET, FILM COATED ORAL at 08:25

## 2024-09-01 RX ADMIN — ACETAMINOPHEN 1000 MG: 500 TABLET ORAL at 04:23

## 2024-09-01 RX ADMIN — DOCUSATE SODIUM 100 MG: 100 CAPSULE, LIQUID FILLED ORAL at 21:24

## 2024-09-01 RX ADMIN — DOCUSATE SODIUM 100 MG: 100 CAPSULE, LIQUID FILLED ORAL at 08:26

## 2024-09-01 RX ADMIN — IBUPROFEN 800 MG: 400 TABLET, FILM COATED ORAL at 16:57

## 2024-09-01 RX ADMIN — ACETAMINOPHEN 1000 MG: 500 TABLET ORAL at 21:24

## 2024-09-01 RX ADMIN — ACETAMINOPHEN 1000 MG: 500 TABLET ORAL at 14:00

## 2024-09-01 RX ADMIN — SERTRALINE HYDROCHLORIDE 100 MG: 50 TABLET ORAL at 11:10

## 2024-09-01 ASSESSMENT — PAIN DESCRIPTION - LOCATION: LOCATION: PERINEUM

## 2024-09-01 ASSESSMENT — PAIN DESCRIPTION - DESCRIPTORS: DESCRIPTORS: SORE

## 2024-09-01 ASSESSMENT — PAIN SCALES - GENERAL
PAINLEVEL_OUTOF10: 2
PAINLEVEL_OUTOF10: 4
PAINLEVEL_OUTOF10: 4

## 2024-09-01 ASSESSMENT — PAIN DESCRIPTION - ORIENTATION: ORIENTATION: LOWER

## 2024-09-01 ASSESSMENT — PAIN - FUNCTIONAL ASSESSMENT: PAIN_FUNCTIONAL_ASSESSMENT: ACTIVITIES ARE NOT PREVENTED

## 2024-09-01 NOTE — DISCHARGE INSTRUCTIONS
Postpartum Support Groups  We know that all of us are dealing with a tremendous amount of uncertainty, confusion and disruption to our daily lives, which may result in increased anxiety, depression and fear. If you are feeling unsettled or worse, please know that we are here to help. During this time of increased caution and care for one another, [facility] [place] ([facility]) is offering virtual support groups to ALL MOTHERS in [place] regardless of the age of your child/children as a way to help weather this emotional storm together. Social support is an important part of self-care during this time of physical distancing.  Virtual postpartum support group meetings available at www.postpartumva.org  Warm Line: 474.460.6800    Breastfeeding Support Groups    and  of each month at [place]   and  of each month at [place]      https://www.[employer]-prenatal-education-events         Postpartum Care at Home With Your Baby: Care Instructions  Overview     After childbirth (postpartum period), your body goes through many changes as you recover. In these weeks after delivery, try to take good care of yourself. Get rest whenever you can and accept help from others.  It may take 4 to 6 weeks to feel like yourself again, and possibly longer if you had a  birth. You may feel sore or very tired as you recover. After delivery, you may continue to have contractions as the uterus returns to the size it was before your pregnancy. You will also have some vaginal bleeding. And you may have pain around the vagina as you heal. Several days after delivery you may also have pain and swelling in your breasts as they fill with milk. There are things you can do at home to help ease these discomforts.  After childbirth, it's common to feel emotional. You may feel irritable, cry easily, and feel happy one minute and sad the next. This is called the \"baby blues.\" Hormone changes are

## 2024-09-01 NOTE — L&D DELIVERY NOTE
Called to patient's room for delivery. Vertex delivered with spontaneous maternal pushing efforts over supported perineum. Nuchal cord x1 easily reduced. Anterior shoulder delivered with gentle downward traction from CNM followed immediately by posterior shoulder and body. Infant noted to be vigorous. Placed on maternal abdomen. Drying/tactile stimulation and bulb suction by RN staff. Delayed cord clamping. Cord double clamped by CNM and then cut by FOB. Infant skin to skin with mother. Tessa placenta delivered intact. Pitocin IV per protocol. Vulva, vagina and perineum inspected and found to have second degree perineal lac which was repaired in usual fashion to obtain excellent hemostasis under epidural anesthesia. Bilateral periurethral lacs noted and found to be hemostatic, no repair necessary. Tia care completed. Mom and baby doing well, left stable and attended.        ALISSA Mckenzie [[id number]]      Labor Events     Labor: No   Steroids: None  Cervical Ripening Date/Time:      Antibiotics Received during Labor: No  Rupture Date/Time:  24 14:29:00   Rupture Type: AROM  Fluid Color: Yellow  Induction: None  Labor Complications: None       Anesthesia    Method: Epidural       Labor Event Times      Labor onset date/time:  24 05:00:00 EDT     Dilation complete date/time:        Start pushing date/time:     Decision date/time (emergent ):            Delivery Details      Delivery Date: 24 Delivery Time: 22:19:00   Delivery Type: Vaginal, Spontaneous              Williams Presentation    Presentation: Vertex  Position: Middle  _: Occiput  _: Anterior       Shoulder Dystocia    Shoulder Dystocia Present?: No       Assisted Delivery Details    Forceps Attempted?: No  Vacuum Extractor Attempted?: No                           Cord    Vessels: 3 Vessels  Complications: None  Delayed Cord Clamping?: Yes  Cord Clamped Date/Time: 2024 22:24:00  Cord Blood Disposition:

## 2024-09-01 NOTE — LACTATION NOTE
This note was copied from a baby's chart.  . Mother states that she did notice breast changes during pregnancy. Colostrum easily expressed from both breasts. Mother has shorter nipples. Assisted mother latching baby to both breasts in the football hold with the nipple shield. Audible swallows noted. Educated mother on feeding cues, feeding on demand, offering both breasts at every feeding, and feeding at least every 3 hours.     Feeding Plan:  Mother will keep baby skin to skin as often as possible, feed on demand, 8-12x/day , respond to feeding cues, obtain latch, listen for audible swallowing, be aware of signs of oxytocin release/ cramping,thirst,sleepiness while breastfeeding, offer both breasts,and will not limit feedings.  Mother agrees to utilize breast massage while nursing to facilitate lactogenesis.

## 2024-09-01 NOTE — PROGRESS NOTES
Has been complete. Attempted pushing. Having a lot of back pain. Has had several re-doses of epidural. Post 2 decels. Currently recovering after being in hands and knees.     FHR 150s now. Min to mod dania.   Ctx now coupling and irreg.     Will plan to start Pitocin if no further decels noted.

## 2024-09-01 NOTE — PROGRESS NOTES
Post-Partum Day Number 1 Progress Note    Patient doing well post-partum without significant complaints.  Voiding without difficulty, normal lochia.  Feeling well.  H/o depression/anxiety, on Zoloft which was recently increased to 100mg.  She reports she feels that her mood is stable.  Hpwever, she and Dr. Ulloa had discussed using Zurzuvae PP>    Vitals:  Patient Vitals for the past 24 hrs:   BP Temp Temp src Pulse Resp SpO2   24 0825 (!) 150/83 97.5 °F (36.4 °C) Oral 71 16 96 %   24 0650 107/61 97.5 °F (36.4 °C) Oral 71 16 96 %   24 0440 131/84 97.7 °F (36.5 °C) Oral 81 16 96 %   24 0145 129/80 97.9 °F (36.6 °C) Oral 84 16 96 %   24 0046 133/60 -- -- 76 16 --   24 0031 (!) 119/57 -- -- 81 16 --   24 0016 (!) 141/64 -- -- 77 16 --   24 0001 (!) 140/65 -- -- 90 18 --   24 2346 (!) 141/63 -- -- 74 18 --   24 2331 139/66 -- -- 83 18 --   24 2316 137/66 -- -- 88 18 --   24 2315 137/66 -- -- 88 18 --   24 2309 135/65 -- -- 93 16 --   24 2245 (!) 141/75 99.3 °F (37.4 °C) Oral 98 18 98 %   24 -- 98.7 °F (37.1 °C) Oral -- 16 --   24 193 123/71 -- -- 83 -- --   24 1907 133/70 -- -- -- -- --   24 1827 (!) 106/59 -- -- -- -- --   24 1824 (!) 124/56 -- -- 83 -- --   24 1820 -- -- -- 80 -- --   24 1818 (!) 110/54 -- -- 79 -- --   24 1804 (!) 97/52 -- -- -- -- --   24 1800 (!) 102/50 98.5 °F (36.9 °C) -- -- 15 --   24 1757 128/73 -- -- -- -- --   24 1600 109/62 98.6 °F (37 °C) -- 80 16 --   24 1200 (!) 127/52 -- -- 80 -- 100 %     Temp (24hrs), Av.2 °F (36.8 °C), Min:97.5 °F (36.4 °C), Max:99.3 °F (37.4 °C)      Vital signs stable, afebrile.    Exam:  Patient without distress.               Abdomen soft, fundus firm, nontender               Lower extremities- nontender without edema; no cords    Labs: No results found for this or any previous visit (from the past

## 2024-09-02 VITALS
TEMPERATURE: 98 F | RESPIRATION RATE: 16 BRPM | DIASTOLIC BLOOD PRESSURE: 72 MMHG | SYSTOLIC BLOOD PRESSURE: 120 MMHG | HEART RATE: 71 BPM | OXYGEN SATURATION: 98 %

## 2024-09-02 LAB — RPR SER QL: NONREACTIVE

## 2024-09-02 PROCEDURE — 6370000000 HC RX 637 (ALT 250 FOR IP): Performed by: MIDWIFE

## 2024-09-02 PROCEDURE — 6370000000 HC RX 637 (ALT 250 FOR IP): Performed by: STUDENT IN AN ORGANIZED HEALTH CARE EDUCATION/TRAINING PROGRAM

## 2024-09-02 RX ORDER — ZURANOLONE 25 MG/1
50 CAPSULE ORAL EVERY EVENING
Qty: 24 CAPSULE | Refills: 0 | Status: SHIPPED | OUTPATIENT
Start: 2024-09-02

## 2024-09-02 RX ADMIN — IBUPROFEN 800 MG: 400 TABLET, FILM COATED ORAL at 00:50

## 2024-09-02 RX ADMIN — DOCUSATE SODIUM 100 MG: 100 CAPSULE, LIQUID FILLED ORAL at 10:19

## 2024-09-02 RX ADMIN — SERTRALINE HYDROCHLORIDE 100 MG: 50 TABLET ORAL at 10:18

## 2024-09-02 RX ADMIN — IBUPROFEN 800 MG: 400 TABLET, FILM COATED ORAL at 10:18

## 2024-09-02 RX ADMIN — ACETAMINOPHEN 1000 MG: 500 TABLET ORAL at 06:17

## 2024-09-02 NOTE — DISCHARGE SUMMARY
Obstetrical Discharge Summary     Name: Deb Mckenzie MRN: [id number]  SSN: [id number]    YOB: 1995  Age: 28 y.o.  Sex: female      Allergies: Patient has no known allergies.    Admit Date: 2024    Discharge Date: 2024     Admitting Physician: Edin Youssef MD     Attending Physician:  Yanci Richardson DO * Admission Diagnoses: Uterine contractions [O47.9]    * Discharge Diagnoses:   Magaly, ALISSA Boyd [[id number]]      Events of Labor     labor?: No   steroids?: None  Cervical ripening date/time:     Antibiotics received during labor?: No  Rupture date/time: 24 142   Rupture type: AROM  Fluid color: Yellow  Induction: None  Labor complications: None                      Additional Diagnoses:    Lab Results   Component Value Date/Time    ABORH O POSITIVE 2024 09:45 AM    There is no immunization history for the selected administration types on file for this patient.    * Procedures:   * No surgery found *           * Discharge Condition: Good    * Hospital Course: Normal hospital course following the delivery.    * Disposition: Home    Discharge Medications:      Medication List        START taking these medications      Zurzuvae 25 MG Caps  Generic drug: Zuranolone  Take 50 mg by mouth every evening If unable to function with too much sedation, please discontinue and call your doctor for a lower dose Max Daily Amount: 50 mg            CONTINUE taking these medications      sertraline 50 MG tablet  Commonly known as: ZOLOFT               Where to Get Your Medications        These medications were sent to [facility]/pharmacy #[id number] - [place], [place] - [address] [facility] - P [phone] - F [phone]  [address] [place] [address]      Phone: [phone]   Zurzuvae 25 MG Caps         * Follow-up Care/Patient Instructions:  Activity: no sex for 6 weeks  Diet: regular diet  Wound Care: ice to area for comfort  Pt to be started on Zurvuvae to lessen

## 2024-09-02 NOTE — PROGRESS NOTES
Post-Partum Day Number 2 Progress Note    Patient doing well post-partum without significant complaints.  Voiding without difficulty, normal lochia.    Vitals:  Patient Vitals for the past 24 hrs:   BP Temp Temp src Pulse Resp SpO2   24 1001 120/72 98 °F (36.7 °C) Oral 71 16 98 %   24 0220 112/71 97.8 °F (36.6 °C) Oral 67 16 96 %   24 2125 131/70 97.9 °F (36.6 °C) Oral 70 16 95 %   24 1657 (!) 116/59 97.6 °F (36.4 °C) Oral 71 16 97 %     Temp (24hrs), Av.8 °F (36.6 °C), Min:97.6 °F (36.4 °C), Max:98 °F (36.7 °C)      Vital signs stable, afebrile.    Exam:  Patient without distress.               Abdomen soft, fundus firm, nontender               Lower extremities- nontender without edema; no cords    Labs: No results found for this or any previous visit (from the past 24 hour(s)).    Assessment and Plan:  Patient appears to be having uncomplicated post-partum course.  Discharge today-instructions reviewed.  O POSITIVE  / [place]/ female infant. H/o depression/ anxiety- already on zoloft. Pt had discussion with Dr. Richardson during prenatal care to discuss option of overlap with Zurzuvae to reduce issues with worsening depression postpartum. Will send over with discharge meds.     No